# Patient Record
Sex: MALE | Race: BLACK OR AFRICAN AMERICAN | Employment: UNEMPLOYED | URBAN - METROPOLITAN AREA
[De-identification: names, ages, dates, MRNs, and addresses within clinical notes are randomized per-mention and may not be internally consistent; named-entity substitution may affect disease eponyms.]

---

## 2017-09-10 ENCOUNTER — HOSPITAL ENCOUNTER (EMERGENCY)
Facility: CLINIC | Age: 22
Discharge: NURSING, REHAB OR LONG-TERM CARE | End: 2017-09-11
Attending: EMERGENCY MEDICINE | Admitting: EMERGENCY MEDICINE
Payer: COMMERCIAL

## 2017-09-10 VITALS
HEIGHT: 70 IN | HEART RATE: 81 BPM | WEIGHT: 145 LBS | SYSTOLIC BLOOD PRESSURE: 125 MMHG | TEMPERATURE: 98.6 F | DIASTOLIC BLOOD PRESSURE: 79 MMHG | RESPIRATION RATE: 20 BRPM | OXYGEN SATURATION: 100 % | BODY MASS INDEX: 20.76 KG/M2

## 2017-09-10 DIAGNOSIS — R45.851 SUICIDAL IDEATION: ICD-10-CM

## 2017-09-10 PROCEDURE — 99285 EMERGENCY DEPT VISIT HI MDM: CPT | Mod: 25

## 2017-09-10 PROCEDURE — 90791 PSYCH DIAGNOSTIC EVALUATION: CPT

## 2017-09-10 ASSESSMENT — ENCOUNTER SYMPTOMS
HALLUCINATIONS: 0
SHORTNESS OF BREATH: 0

## 2017-09-10 NOTE — ED PROVIDER NOTES
"  History     Chief Complaint:  Suicidal    HPI   Jesus Roberts is a 21 year old male, with no previous past medical history, who presents via EMS to the emergency department for evaluation of suicidal ideation. The patient was brought in via EMS after sending multiple texts to his ex-girlfriend about \"going to buy a gun and shooting myself\" and that \"this is the last time you'll ever hear from me\" yesterday and earlier today. It was reported that the patient is currently homeless and living out of his car, as his previous living situation was with his mom, before she went to rehabilitation. On questioning, the patient denies any plan to follow through on his threats, but does note he has homicidal ideation to hurt others. The patient reports that he is currently feeling depressed, but has never been hospitalized for mental illness. He notes that he also smokes marijuana \"a lot and all day\". The patient denies any alcohol use, chest pain or shortness of breath.     Allergies:  No Known Drug Allergies      Medications:    The patient is not currently taking any prescribed medications.     Past Medical History:    The patient denies any relevant past medical history.     Past Surgical History:    History reviewed. No pertinent past surgical history.     Family History:    Mother currently in rehab    Social History:  The patient was accompanied to the ED by EMS.  Smoking Status: Marijuana  Smokeless Tobacco: N/A  Alcohol Use: No   Marital Status:  Single [1]     Review of Systems   Respiratory: Negative for shortness of breath.    Cardiovascular: Negative for chest pain.   Psychiatric/Behavioral: Negative for hallucinations and suicidal ideas.        Homicidal ideation   All other systems reviewed and are negative.    Physical Exam   Vitals:  Patient Vitals for the past 24 hrs:   BP Temp Temp src Pulse Resp SpO2 Height Weight   09/10/17 1800 - - - - - - 1.778 m (5' 10\") 65.8 kg (145 lb)   09/10/17 1758 125/79 98.6 "  F (37  C) Temporal 81 20 100 % - -       Physical Exam  General: Patient in mild distress.  Alert and cooperative with exam. Normal mentation  HEENT: NC/AT. Conjunctiva without injection or scleral icterus. External ears normal.  Respiratory: Breathing comfortably on room air  CV: Normal rate, all extremities well perfused  GI:  Non-distended abdomen  Skin: Warm, dry, no rashes/open wounds on exposed skin  Musculoskeletal: No obvious deformities  Neuro: Alert, answers questions appropriately. No gross motor deficits  Psychiatric: Flat affect. Endorses making suicidal statements, denies current suicidal ideation. Endorses passive homicidal ideation with no intended target. Denies hallucinations. Endorses daily marijuana use and depression    Emergency Department Course   Emergency Department Course:  Nursing notes and vitals reviewed.  I performed an exam of the patient as documented above.     I personally answered all related questions prior to transfer to another facility.    Impression & Plan      Medical Decision Making:  Patient is a 21 year old male who presents on  hold after making suicidal statements via text. Patient's medical history and records were reviewed. On evaluation, he denies any toxic ingestion or intentional overdose. Endorses daily marijuana use.. Patient has a flat affect and provides minimal additional information. He does report that he sent suicidal text messages, however, states he no longer feel suicidal. Patient additionally reports passive homicidal ideation with no identified target. Given nature of patient's text messages and affect on exam, there is concern for patient's safety. He will be placed on a 72 hour hold and will be transferred to [facility] for further mental evaluation and care. Patient remains stable throughout ED stay.    Diagnosis:    ICD-10-CM    1. Suicidal ideation R45.851       Disposition:   Transfer to [place].    Scribe Disclosure:  GRACIE  Leia Vogt, am serving as a scribe at 6:09 PM on 9/10/2017 to document services personally performed by David Jacobson DO, based on my observations and the provider's statements to me.  9/10/2017    EMERGENCY DEPARTMENT       David Jacobson DO  09/10/17 6575

## 2017-09-10 NOTE — ED NOTES
Bed: BH3  Expected date:   Expected time:   Means of arrival:   Comments:  Marisa 444 Suicidal Hold 51 male

## 2017-09-11 ENCOUNTER — HOSPITAL ENCOUNTER (INPATIENT)
Facility: CLINIC | Age: 22
LOS: 1 days | Discharge: HOME | DRG: 882 | End: 2017-09-11
Attending: PSYCHIATRY & NEUROLOGY | Admitting: PSYCHIATRY & NEUROLOGY
Payer: COMMERCIAL

## 2017-09-11 VITALS
RESPIRATION RATE: 16 BRPM | WEIGHT: 143.38 LBS | DIASTOLIC BLOOD PRESSURE: 66 MMHG | HEART RATE: 54 BPM | OXYGEN SATURATION: 99 % | BODY MASS INDEX: 20.53 KG/M2 | SYSTOLIC BLOOD PRESSURE: 131 MMHG | HEIGHT: 70 IN | TEMPERATURE: 97.9 F

## 2017-09-11 PROBLEM — R45.851 SUICIDAL IDEATION: Status: ACTIVE | Noted: 2017-09-11

## 2017-09-11 PROCEDURE — 25000132 ZZH RX MED GY IP 250 OP 250 PS 637: Performed by: PSYCHIATRY & NEUROLOGY

## 2017-09-11 PROCEDURE — 12400008 ZZH R&B MH INTERMEDIATE ADOLESCENT

## 2017-09-11 PROCEDURE — 99235 HOSP IP/OBS SAME DATE MOD 70: CPT | Performed by: CLINICAL NURSE SPECIALIST

## 2017-09-11 RX ORDER — OLANZAPINE 5 MG/1
10 TABLET ORAL
Status: DISCONTINUED | OUTPATIENT
Start: 2017-09-11 | End: 2017-09-11 | Stop reason: HOSPADM

## 2017-09-11 RX ORDER — OLANZAPINE 10 MG/2ML
10 INJECTION, POWDER, FOR SOLUTION INTRAMUSCULAR
Status: DISCONTINUED | OUTPATIENT
Start: 2017-09-11 | End: 2017-09-11 | Stop reason: HOSPADM

## 2017-09-11 RX ORDER — BISACODYL 10 MG
10 SUPPOSITORY, RECTAL RECTAL DAILY PRN
Status: DISCONTINUED | OUTPATIENT
Start: 2017-09-11 | End: 2017-09-11 | Stop reason: HOSPADM

## 2017-09-11 RX ORDER — TRAZODONE HYDROCHLORIDE 50 MG/1
50 TABLET, FILM COATED ORAL
Status: DISCONTINUED | OUTPATIENT
Start: 2017-09-11 | End: 2017-09-11 | Stop reason: HOSPADM

## 2017-09-11 RX ORDER — ALUMINA, MAGNESIA, AND SIMETHICONE 2400; 2400; 240 MG/30ML; MG/30ML; MG/30ML
30 SUSPENSION ORAL EVERY 4 HOURS PRN
Status: DISCONTINUED | OUTPATIENT
Start: 2017-09-11 | End: 2017-09-11 | Stop reason: HOSPADM

## 2017-09-11 RX ORDER — HYDROXYZINE HYDROCHLORIDE 25 MG/1
25-50 TABLET, FILM COATED ORAL EVERY 4 HOURS PRN
Status: DISCONTINUED | OUTPATIENT
Start: 2017-09-11 | End: 2017-09-11 | Stop reason: HOSPADM

## 2017-09-11 RX ORDER — ACETAMINOPHEN 325 MG/1
650 TABLET ORAL EVERY 4 HOURS PRN
Status: DISCONTINUED | OUTPATIENT
Start: 2017-09-11 | End: 2017-09-11 | Stop reason: HOSPADM

## 2017-09-11 RX ADMIN — ACETAMINOPHEN 650 MG: 325 TABLET, FILM COATED ORAL at 17:25

## 2017-09-11 NOTE — PLAN OF CARE
"Problem: Depressive Symptoms  Goal: Depressive Symptoms  Signs and symptoms of listed problems will be absent or manageable.    Patient, prior to discharge, will:  -verbalize decrease in depressive signs/symptoms  -verbalize a decrease in anxiety   -verbalize an understanding of medication regimen   -verbalize absence of SI/SIB   -develop a safety plan  -identify a support system   -will participate in coordination of discharge planning    To promote safety/ mental health    Patient identified the following   Triggers:  ----------  Wellness Strategies:  ----------  Warning Signs:  ----------  Feedback (people they would like to receive feedback from if early warning signs - ex. Family, friends, partner/spouse, support groups, coworkers):  ----------  Taking Action:  ----------  Ways to [place]:      Self-Reflection & Planning.  Assessed patient s progress completing forms related to Illness Management Recovery (including Personal Plan of Care, Adult Coping Plan, and My Support and Coping Plan) and assisted as needed.    Encouraged patient to continue to consider triggers, wellness strategies, early warning signs, feedback from others, actions to take to prevent relapse, and coping strategies as part of a plan to remain well after leaving the hospital.     Outcome: Adequate for Discharge Date Met:  09/11/17 09/11/17 1750   Depressive Symptoms   Depressive Symptoms Assessed all   Depressive Symptoms Present mood;insight;thought process         Discharge order is received.  Reviewed and discussed discharge instructions and follow up care with patient.  Patient is ready to discharge AEB \"I am ready to go.\"  Patient denies Si, SIB or HI. \"I was impulsive when I sent that text.\" patient stated. Personal belongings are given. All forms are signed. Patient denied any further questions or concerns.  Patient discharged to home. Patient refused transportation assistance to home.       "

## 2017-09-11 NOTE — PROGRESS NOTES
09/11/17 1400   Behavioral Health   Hallucinations denies / not responding to hallucinations   Thinking intact   Orientation person, disoriented;place, disoriented;date, disoriented;time, disoriented   Memory baseline memory   Insight admits / accepts   Judgement intact   Eye Contact at examiner   Affect full range affect   Mood mood is calm   Physical Appearance/Attire attire appropriate to age and situation   Hygiene well groomed   Suicidality (GEE)   Self Injury (GEE)   Activity (social )   Speech clear;coherent   Medication Sensitivity no observed side effects   Psychomotor / Gait balanced;steady   Pt was polite and respectful towards staff and other pt's. Pt is going to discharged within minutes and stated he is excited to go home and have the ability to go for a run. No concerns to note

## 2017-09-11 NOTE — IP AVS SNAPSHOT
Child Adolescent  Inpatient Unit    [address] [facility] [address]    Phone:  [phone]    Fax:  [phone]                                       After Visit Summary   9/11/2017    Jesus Roberts    MRN: [id number]           After Visit Summary Signature Page     I have received my discharge instructions, and my questions have been answered. I have discussed any challenges I see with this plan with the nurse or doctor.    ..........................................................................................................................................  Patient/Patient Representative Signature      ..........................................................................................................................................  Patient Representative Print Name and Relationship to Patient    ..................................................               ................................................  Date                                            Time    ..........................................................................................................................................  Reviewed by Signature/Title    ...................................................              ..............................................  Date                                                            Time

## 2017-09-11 NOTE — IP AVS SNAPSHOT
MRN:[id number]                      After Visit Summary   9/11/2017    Jesus Roberts    MRN: [id number]           Thank you!     Thank you for choosing [facility] for your care. Our goal is always to provide you with excellent care.        Patient Information     Date Of Birth          1995        Designated Caregiver       Most Recent Value    Caregiver    Will someone help with your care after discharge? yes    Name of designated caregiver Rock Gunter - cousin    Phone number of caregiver unknown     Caregiver address unknown      About your hospital stay     You were admitted on:  September 11, 2017 You last received care in the:  Child Adolescent  Inpatient Unit    You were discharged on:  September 11, 2017       Who to Call     For medical emergencies, please call 911.  For non-urgent questions about your medical care, please call your primary care provider or clinic, None          Attending Provider     Provider Specialty    Rad Mcdonnell MD Psychiatry       Primary Care Provider    Physician No Ref-Primary      Further instructions from your care team       Behavioral Discharge Planning and Instructions      Summary: You were admitted on 9/11/2017 to Station [address] [place] [address] due to suicidal ideation.  You were treated by Debra Naegele, APRN, CNS and discharged on 09/11/17 from Station  Young Adult Unit to Home.     Principal Diagnosis:   Suicidal Ideations    Health Care Follow-up Appointments:   Below are resources you may contact for therapy and medication management  [facility] [address] (On street parking is available)  [place] [address]  P:  [phone]  M, W, F:   1:00PM - 3:00PM  M - Th:    6:30PM - 8:30PM  At [facility] you have access to counseling services at free of charge and your sessions are confidential. You may remain anonymous if you choose.    [facility]  [address] [facility], [address]  [place] [address]  Phone: [phone]  Fax: [phone]      Attend all scheduled appointments with your outpatient providers. Call at least 24 hours in advance if you need to reschedule an appointment to ensure continued access to your outpatient providers.   Major Treatments, Procedures and Findings: You were provided with: a psychiatric assessment, assessed for medical stability, medication evaluation and/or management, group therapy, art therapy, milieu management, medical interventions and skills/OT groups.    Symptoms to Report: If you experience any of the following symptoms please report them right away to your provider or to family/friends; feeling more aggressive, increased confusion, losing more sleep, mood getting worse or thoughts of suicide.    Early warning signs can include: Early warning signs that could signal a potential relapse could include but not limited to the following; increased depression or anxiety sleep disturbances increased thoughts or behaviors of suicide or self-harm  increased unusual thinking, such as paranoia or hearing voices.    Safety and Wellness: Take all medicines as directed. Make no changes unless your doctor suggests them.      Follow treatment recommendations. Refrain from alcohol and non-prescribed drugs.  Items could include: Firearms  Medicines (both prescribed and over-the-counter)  Knives and other sharp objects  Ropes and like materials  Car keys  If there is a concern for safety, call 911. If there is a concern for safety, call 911..    Resources:    Crisis Intervention: 828.695.2502 or 259-817-3197 (TTY: 309.281.8365).  Call anytime for help.  National [place] on Mental Illness (www.[place].janelle.org): 973.157.7663 or 940-031-0040.  Alcoholics Anonymous (www.alcoholics-anonymous.org): Check your phone book for your local chapter.  National Suicide Prevention Line (www.mentalhealthmn.org): 271-158-LVPD (2397)  Self- Management and Recovery Training., SMART-- Toll free:  "388.461.1483  www.[employer]  [facility] Crisis (COPE) Response - Adult 290 577-1738  Text 4 Life: txt \"LIFE\" to 06561 for immediate support and crisis intervention  Crisis text line: Text \"START\" to 863-927. Free, confidential, .  Crisis Intervention: 860.207.2303 or 351-454-5950. Call anytime for help.     The treatment team has appreciated the opportunity to work with you. Jesus,  please take care and make your recovery a priority. If you have any questions or concerns our unit number is [phone]. You will be receiving a follow-up phone call within the next three days from a representative from behavioral health. You have identified the best phone number to reach you at is [phone] (home) .      Pending Results     No orders found from 2017 to 2017.            Admission Information     Date & Time Provider Department Dept. Phone    2017 Rad Mcdonnell MD Child Adolescent  Inpatient Unit [phone]      Your Vitals Were     Blood Pressure Pulse Temperature Respirations Height Weight    131/66 54 97.9  F (36.6  C) (Oral) 16 1.78 m (5' 10.08\") 65 kg (143 lb 6 oz)    Pulse Oximetry BMI (Body Mass Index)                99% 20.53 kg/m2          MyChart Information     [facility] lets you send messages to your doctor, view your test results, renew your prescriptions, schedule appointments and more. To sign up, go to [email or web address] . Click on \"Log in\" on the left side of the screen, which will take you to the Welcome page. Then click on \"Sign up Now\" on the right side of the page.     You will be asked to enter the access code listed below, as well as some personal information. Please follow the directions to create your username and password.     Your access code is: [email or web address]  Expires: 12/10/2017  1:46 PM     Your access code will  in 90 days. If you need help or a new code, please call your [facility] clinic or [phone].        Care EveryWhere ID     " This is your Care EveryWhere ID. This could be used by other organizations to access your [facility] medical records  [id number]        Equal Access to Services     JESSICA TRACY : Adriana Morgan, jon guajardobarbha, miguelalma locosocratescata cintronlars, waxeugenia alexin hayaadiego cintronlatoya cotter lakaileediego moffett. So [facility] [phone].    ATENCIÓN: Si habla español, tiene a blum disposición servicios gratuitos de asistencia lingüística. Llame al [phone].    We comply with applicable federal civil rights laws and [place] laws. We do not discriminate on the basis of race, color, national origin, age, disability sex, sexual orientation or gender identity.               Review of your medicines      Notice     You have not been prescribed any medications.             Protect others around you: Learn how to safely use, store and throw away your medicines at www.disposemymeds.org.             Medication List: This is a list of all your medications and when to take them. Check marks below indicate your daily home schedule. Keep this list as a reference.      Notice     You have not been prescribed any medications.

## 2017-09-11 NOTE — PROGRESS NOTES
09/11/17 0303   Patient Belongings   Did you bring any home meds/supplements to the hospital?  No   Patient Belongings cell phone/electronics;clothing;money (see comment);shoes;wallet   Disposition of Belongings Cash, debit, & ID cards to security. All other items are locked in storage locker #27 on [address].   Belongings Search Yes   Clothing Search Yes   Second Staff Donovan TAMAYO     Items sent to security in envelope #[id number]: $29.00 cash, [place] drivers license, AAA member card, visa debit ending in 3632, TCF debit ending in 7406,    Items locked in locker #27 on 7AE: gray hooded sweatshirt, green sweatpants w/ strings, white shoes w/ laces, lighter, cigarettes, smart phone, phone , brown wallet (contains club mystic card, fortune cookie fortunes, pictures, and various business cards).    A               Admission:  I am responsible for any personal items that are not sent to the safe or pharmacy.  [facility] is not responsible for loss, theft or damage of any property in my possession.    Signature:  _________________________________ Date: _______  Time: _____                                              Staff Signature:  ____________________________ Date: ________  Time: _____      2nd Staff person, if patient is unable/unwilling to sign:    Signature: ________________________________ Date: ________  Time: _____     Discharge:  [facility] has returned all of my personal belongings:    Signature: _________________________________ Date: ________  Time: _____                                          Staff Signature:  ____________________________ Date: ________  Time: _____

## 2017-09-11 NOTE — ED NOTES
"Pt refuses to provide urine specimen. Demands to talk to someone who can \"let me out of here.\" Provider notified.  "

## 2017-09-11 NOTE — PROGRESS NOTES
Writer spoke with pt's cousin Rock Gunter ([phone]). Rock stated he did not have any concerns regarding pt's mental health and that pt could come and stay with him upon d/c. Info relayed to provider

## 2017-09-11 NOTE — PROGRESS NOTES
Pt did not attend any Art Therapy groups today. Writer approached pt and invited him to join but he declined.

## 2017-09-11 NOTE — PHARMACY-ADMISSION MEDICATION HISTORY
Admission medication history interview status for the 9/10/2017  admission is complete. See EPIC admission navigator for prior to admission medications     Medication history source reliability:Good    Actions taken by pharmacist (provider contacted, etc):None     Additional medication history information not noted on PTA med list : Patient denies use of any prescription medications or over the counters such as vitamins, herbals, supplements, or ibuprofen/acetaminophen.     Medication reconciliation/reorder completed by provider prior to medication history? No    Time spent in this activity: 5 minutes      Prior to Admission medications    Not on File
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
## 2017-09-11 NOTE — DISCHARGE INSTRUCTIONS
Behavioral Discharge Planning and Instructions      Summary: You were admitted on 9/11/2017 to Station [address] [place] [address] due to suicidal ideation.  You were treated by Debra Naegele, APRN, CNS and discharged on 09/11/17 from [facility] Young Adult Unit to Home.     Principal Diagnosis:   Suicidal Ideations    Health Care Follow-up Appointments:   Below are resources you may contact for therapy and medication management  [facility] [address] (On street parking is available)  [place] [address]  P:  [phone]  M, W, F:   1:00PM - 3:00PM  M - Th:    6:30PM - 8:30PM  At [facility] you have access to counseling services at free of charge and your sessions are confidential. You may remain anonymous if you choose.    [facility]  [address] [facility], [address] [address]  Phone: [phone]  Fax: [phone]      Attend all scheduled appointments with your outpatient providers. Call at least 24 hours in advance if you need to reschedule an appointment to ensure continued access to your outpatient providers.   Major Treatments, Procedures and Findings: You were provided with: a psychiatric assessment, assessed for medical stability, medication evaluation and/or management, group therapy, art therapy, milieu management, medical interventions and skills/OT groups.    Symptoms to Report: If you experience any of the following symptoms please report them right away to your provider or to family/friends; feeling more aggressive, increased confusion, losing more sleep, mood getting worse or thoughts of suicide.    Early warning signs can include: Early warning signs that could signal a potential relapse could include but not limited to the following; increased depression or anxiety sleep disturbances increased thoughts or behaviors of suicide or self-harm  increased unusual thinking, such as paranoia or hearing voices.    Safety and Wellness: Take all  "medicines as directed. Make no changes unless your doctor suggests them.      Follow treatment recommendations. Refrain from alcohol and non-prescribed drugs.  Items could include: Firearms  Medicines (both prescribed and over-the-counter)  Knives and other sharp objects  Ropes and like materials  Car keys  If there is a concern for safety, call 911. If there is a concern for safety, call 911..    Resources:    Crisis Intervention: 158.958.1650 or 798-215-6294 (TTY: 397.841.7635).  Call anytime for help.  National [place] on Mental Illness (www.[place].janelle.org): 584.565.6824 or 911-137-7531.  Alcoholics Anonymous (www.alcoholics-anonymous.org): Check your phone book for your local chapter.  National Suicide Prevention Line (www.mentalhealthmn.org): 738-071-EIGL (8566)  Self- Management and Recovery Training., SMART-- Toll free: 272.963.2404  www.[employer]  [facility] Crisis (COPE) Response - Adult 591 851-7626  Text 4 Life: txt \"LIFE\" to 99472 for immediate support and crisis intervention  Crisis text line: Text \"START\" to 855-904. Free, confidential, 24/7.  Crisis Intervention: 312.514.3962 or 686-429-0588. Call anytime for help.     The treatment team has appreciated the opportunity to work with you. Jesus,  please take care and make your recovery a priority. If you have any questions or concerns our unit number is [phone]. You will be receiving a follow-up phone call within the next three days from a representative from behavioral health. You have identified the best phone number to reach you at is [phone] (home) .    "

## 2017-09-12 NOTE — H&P
"DATE OF ASSESSMENT:  09/11/2017      IDENTIFYING INFORMATION:  Jesus Roberts is a 21-year-old single -American male presenting with suicidal ideation.      CHIEF COMPLAINT:  \"I told the ER doc I was not suicidal.\"      HISTORY OF PRESENT ILLNESS:  Jesus Landin is a 21-year-old single male who was brought in by EMS.  He was presenting with depression, suicidal thinking and threats.  Patient texted his girlfriend 7 times saying that he was going to get a gun and shoot himself.  He reports that he sent these texts within a span of less than 10 minutes.  Patient reports he has been arguing with his girlfriend.  They have joint custody of their 4-month-old son.  He has other life stressors including his mother is getting chemical dependency treatment in [place].  He is not sure if she is addicted to heroin or meth.  Patient reports since his mother is away at treatment, he is homeless.  Patient reports he can live with his cousin.  Patient is looking for a job but was unable to find his legal papers including social security number and birth certificate.  Patient reports he wants to start working and live with his cousin.        PSYCHIATRIC REVIEW OF SYSTEMS:  Patient reports that he has anxiety due to his recent life stressors including mother going into treatment and being homeless.  Patient reports that when his anxiety gets high, he often will get depressed.  He is denying any low energy, poor appetite or poor concentration.  He denies feeling hopeless and helpless.  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  He denies any suicidal thoughts.  He does not have a plan.  He denies any homicidal thoughts.  He denies any symptoms of anthony.  He does not endorse psychosis including auditory and visual hallucinations.  He does not endorse any paranoia.  The patient reports he does get anxiety about being a single parent and " "working out custody with his girlfriend regarding their 4-month-old son.  Patient denies any panic disorder.  Patient denies any symptoms of PTSD, eating disorder or OCD.      PSYCHIATRIC HISTORY:  Patient reports he had a rule 25 assessment when he was 16 years old.  He denies ever going to treatment.  He has never been treated for mental illness.  He has no prior inpatient hospitalizations.  He denies any prior suicide attempts or engaging in any self-injurious behavior.  He has never been treated with any psychiatric medications.      PAST MEDICAL HISTORY:  No active issues reported.      SUBSTANCE ABUSE HISTORY:  Patient refused to take a U-tox. \"I thought you would keep me longer in the hospital.\" He reports that he is a daily cannabis user.  He denies using alcohol.  He denies any legal problems from cannabis use.      ALLERGIES:  No known drug allergies.      FAMILY HISTORY:  Patient reports his mother is currently in rehab for either heroin or meth use.  He reports he does not know his father and is not aware of any mental illness on his father's side of the family.      SOCIAL HISTORY:  Patient reports he lived in [place].  He moved to [place] during middle school.  Reports that he has completed high school.  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  He currently is homeless and is not employed currently. 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      REVIEW OF SYSTEMS:  Reviewed documentation for a 10 point systems review completed by Dr. David Jacobson dated 09/10/2017, no changes noted.      PHYSICAL EXAMINATION:   VITAL SIGNS:  Blood pressure 125/79, temperature is 98.6 Fahrenheit, pulse is 81, respirations 20, spo2 is at 100%.  Height is 5 feet, 10 inches, weight is 145 pounds.  Reviewed documentation for the physical examination completed by Dr. Jacobson dated 09/10/2017:  No changes noted.      MENTAL STATUS EXAMINATION:  Patient appears his stated age.  He is " dressed in scrubs.  He has adequate hygiene.  Patient was in the [facility] area and was cooperative in accompanying me to the interview room.  He was calm throughout the interview.  He maintained adequate eye contact.  He did not display any psychomotor abnormalities.  His speech was spontaneous.  He used conversational rate, rhythm and tone.  He elaborated appropriately with regard to the events that led up to his hospitalization.  He describes his mood today as okay.  Affect was full range and congruent.  Thought process was linear and logical.  Associations were intact.  Thought content did not display any evidence of psychosis.  He does not endorse any suicidal thinking.  He does not have any active plan.  He denies any homicidal thoughts.  Insight and judgment appear to be fair.  Cognition appears intact to interviewing including orientation to person, place, time and situation, use of language and fund of knowledge.  His recent and remote memory are grossly intact.  Muscle strength, tone and gait appear to be within normal limits upon observation.      ASSESSMENT:  Adjustment disorder with anxiety.                               Cannabis use disorder moderate     PLAN:   1.  The patient has been admitted to unit 4A  Currently residing on 7A on a 72-hour hold.  Discontinue 72 hour hold.  Patient is cooperative and willing to cooperate with his treatment plan.   2.  The patient declined to start any medications to address his anxiety. Patient does not feel he is depressed and declined any medication.  Patient was given education on cannabis and its effect on mood.  Recommendation was to abstain from all mood altering substances.   3.  Psychosocial treatments to be addressed with social work consult.   4. Patient would benefit form therapy. He stated he was interested in therapy and thanked [facility] for referral.         DEBRA A. NAEGELE, APRN, CNS             D: 09/11/2017 17:38   T: 09/11/2017 20:09   MT: ANDRAE      Name:      PABLO HER   MRN:      [id number]        Account:      [id number]   :      1995           Admitted:     [id number]      Document: [id number]

## 2017-09-12 NOTE — DISCHARGE SUMMARY
Psychiatric Discharge Summary    Jesus Roberts MRN# [id number]   Age: 21 year old YOB: 1995     Date of Admission:  9/11/2017  Date of Discharge:  9/11/2017  5:30 PM  Admitting Physician:  Rad Mcdonnell MD  Discharge Physician:  Debra A. Naegele, APRN CNS (Contact: [phone])         Event Leading to Hospitalization:   Jesus Landin is a 21-year-old single male who was brought in by EMS.  He was presenting with depression, suicidal thinking and threats.  Patient texted his girlfriend 7 times saying that he was going to get a gun and shoot himself.  He reports that he sent these texts within a span of less than 10 minutes.  Patient reports he has been arguing with his girlfriend.  They have joint custody of their 4-month-old son.  He has other life stressors including his mother is getting chemical dependency treatment in [place].  He is not sure if she is addicted to heroin or meth.  Patient reports since his mother is away at treatment, he is homeless.  Patient reports he can live with his cousin.  Patient is looking for a job but was unable to find his legal papers including social security number and birth certificate.  Patient reports he wants to start working and live with his cousin.              See Admission note by Debra Naegele APRN, CNS found on 9/11/2017 for additional details.          DIagnoses:    Adjustment disorder with anxiety.   Cannabis use disorder moderate           Labs:   No results found for this or any previous visit.         Consults:   No consultations this admission.          Hospital Course:   Jesus Roberts was admitted to Station 4A currently residing on 7A with attending Angel Mcdonnell MD found on a 72 hour mental health hold.  The 72 hour hold was discontinued. The patient was placed under status 15 (15 minute checks) to ensure patient safety.     Patient was admitted for suicidal ideation. Patient reported he has multiple life stressors which  he was having difficulty managing. He was not interested in medication but was interested in therapy.     Patient was given education regarding cannabis use and the detrimental effects if has on mood. Recommendation was to abstain from all mood altering substances. He does not feel he needs chemical dependency treatment. He declined an assessment saying he was assessed when he was 16 years old and treatment was not recommended at that time.     Jesus Roberts did participate in groups and was visible in the milieu. The patient's symptoms of suicidal ideation improved. Patient presented with a stable mood and denied any suicidal ideation. He has protective factors of a supportive cousin and mother. He is future oriented and wants to pursue employment with the help of his cousin. He is interested in therapy to help him manage his life stressors.     He does not meet criteria for hospitalization. He is at low risk for relapse.     Jesus Roberts was released to home. At the time of discharge Jesus Roberts was determined to not be a danger to himself or others.          Discharge Medications:   There are no discharge medications for this patient.           Psychiatric Examination:   Appearance:  awake, alert and adequately groomed  Attitude:  cooperative  Eye Contact:  good  Mood:  good  Affect:  appropriate and in normal range  Speech:  clear, coherent  Psychomotor Behavior:  no evidence of tardive dyskinesia, dystonia, or tics  Thought Process:  logical, linear and goal oriented  Associations:  no loose associations  Thought Content:  no evidence of suicidal ideation or homicidal ideation  Insight:  fair  Judgment:  fair  Oriented to:  time, person, and place  Attention Span and Concentration:  intact  Recent and Remote Memory:  intact  Language: Able to name objects, Able to repeat phrases and Able to read and write  Fund of Knowledge: adequate  Muscle Strength and Tone: normal  Gait and Station: Normal          Discharge Plan:   Summary: You were admitted on 9/11/2017 to Station [address] [place] [address] due to suicidal ideation.  You were treated by Debra Naegele, APRN, CNS and discharged on 09/11/17 from Station [facility] Young Adult Unit to Home.      Principal Diagnosis:   Suicidal Ideations     Health Care Follow-up Appointments:   Below are resources you may contact for therapy and medication management  [facility] [address] (On street parking is available)  [place] [address]  P:  [phone]  M, W, F:   1:00PM - 3:00PM  M - Th:    6:30PM - 8:30PM  At [facility] you have access to counseling services at free of charge and your sessions are confidential. You may remain anonymous if you choose.     [facility]  [address] [facility], [address] [address]  Phone: [phone]  Fax: [phone]        Attend all scheduled appointments with your outpatient providers. Call at least 24 hours in advance if you need to reschedule an appointment to ensure continued access to your outpatient providers.   Major Treatments, Procedures and Findings: You were provided with: a psychiatric assessment, assessed for medical stability, medication evaluation and/or management, group therapy, art therapy, milieu management, medical interventions and skills/OT groups.     Symptoms to Report: If you experience any of the following symptoms please report them right away to your provider or to family/friends; feeling more aggressive, increased confusion, losing more sleep, mood getting worse or thoughts of suicide.     Early warning signs can include: Early warning signs that could signal a potential relapse could include but not limited to the following; increased depression or anxiety sleep disturbances increased thoughts or behaviors of suicide or self-harm  increased unusual thinking, such as paranoia or hearing voices.     Safety and Wellness: Take all medicines as directed. Make  "no changes unless your doctor suggests them.      Follow treatment recommendations. Refrain from alcohol and non-prescribed drugs.  Items could include: Firearms  Medicines (both prescribed and over-the-counter)  Knives and other sharp objects  Ropes and like materials  Car keys  If there is a concern for safety, call 911. If there is a concern for safety, call 911..     Resources:    Crisis Intervention: 574.769.6984 or 861-996-6365 (TTY: 643.310.8964).  Call anytime for help.  National [place] on Mental Illness (www.mn.janelle.org): 737.394.4542 or 955-916-2583.  Alcoholics Anonymous (www.alcoholics-anonymous.org): Check your phone book for your local chapter.  National Suicide Prevention Line (www.mentalhealthmn.org): 724-709-WSTK (1405)  Self- Management and Recovery Training., SMART-- Toll free: 996.408.8462  www.[employer]  [facility] Crisis (COPE) Response - Adult 136 507-5037  Text 4 Life: txt \"LIFE\" to 65806 for immediate support and crisis intervention  Crisis text line: Text \"START\" to 775-474. Free, confidential, 24/7.  Crisis Intervention: 430.974.8600 or 459-533-8085. Call anytime for help.      The treatment team has appreciated the opportunity to work with you. Jesus,  please take care and make your recovery a priority. If you have any questions or concerns our unit number is [phone]. You will be receiving a follow-up phone call within the next three days from a representative from behavioral health. You have identified the best phone number to reach you at is [phone] (home) .     Attestation:  The patient has been seen and evaluated by me,  Debra A. Naegele, APRN CNS on 9/11/2017  Discharge summary time > 30 minutes  "

## 2019-10-31 ENCOUNTER — HOSPITAL ENCOUNTER (EMERGENCY)
Facility: CLINIC | Age: 24
Discharge: HOME | End: 2019-10-31
Attending: EMERGENCY MEDICINE | Admitting: EMERGENCY MEDICINE
Payer: COMMERCIAL

## 2019-10-31 VITALS
HEIGHT: 70 IN | RESPIRATION RATE: 20 BRPM | WEIGHT: 135 LBS | HEART RATE: 67 BPM | OXYGEN SATURATION: 100 % | SYSTOLIC BLOOD PRESSURE: 134 MMHG | TEMPERATURE: 98.6 F | BODY MASS INDEX: 19.33 KG/M2 | DIASTOLIC BLOOD PRESSURE: 67 MMHG

## 2019-10-31 DIAGNOSIS — R10.9 RIGHT FLANK PAIN: ICD-10-CM

## 2019-10-31 LAB
ALBUMIN SERPL-MCNC: 4.4 G/DL (ref 3.4–5)
ALBUMIN UR-MCNC: 10 MG/DL
ALP SERPL-CCNC: 70 U/L (ref 40–150)
ALT SERPL W P-5'-P-CCNC: 20 U/L (ref 0–70)
ANION GAP SERPL CALCULATED.3IONS-SCNC: 5 MMOL/L (ref 3–14)
APPEARANCE UR: CLEAR
AST SERPL W P-5'-P-CCNC: 16 U/L (ref 0–45)
BASOPHILS # BLD AUTO: 0 10E9/L (ref 0–0.2)
BASOPHILS NFR BLD AUTO: 0.5 %
BILIRUB SERPL-MCNC: 0.9 MG/DL (ref 0.2–1.3)
BILIRUB UR QL STRIP: NEGATIVE
BUN SERPL-MCNC: 13 MG/DL (ref 7–30)
CALCIUM SERPL-MCNC: 9.2 MG/DL (ref 8.5–10.1)
CHLORIDE SERPL-SCNC: 107 MMOL/L (ref 94–109)
CO2 SERPL-SCNC: 27 MMOL/L (ref 20–32)
COLOR UR AUTO: YELLOW
CREAT SERPL-MCNC: 1.1 MG/DL (ref 0.66–1.25)
D DIMER PPP FEU-MCNC: <0.3 UG/ML FEU (ref 0–0.5)
DIFFERENTIAL METHOD BLD: NORMAL
EOSINOPHIL # BLD AUTO: 0.2 10E9/L (ref 0–0.7)
EOSINOPHIL NFR BLD AUTO: 2.4 %
ERYTHROCYTE [DISTWIDTH] IN BLOOD BY AUTOMATED COUNT: 13.1 % (ref 10–15)
GFR SERPL CREATININE-BSD FRML MDRD: >90 ML/MIN/{1.73_M2}
GLUCOSE SERPL-MCNC: 87 MG/DL (ref 70–99)
GLUCOSE UR STRIP-MCNC: NEGATIVE MG/DL
HCT VFR BLD AUTO: 46.2 % (ref 40–53)
HGB BLD-MCNC: 16.3 G/DL (ref 13.3–17.7)
HGB UR QL STRIP: NEGATIVE
IMM GRANULOCYTES # BLD: 0 10E9/L (ref 0–0.4)
IMM GRANULOCYTES NFR BLD: 0.2 %
INTERPRETATION ECG - MUSE: NORMAL
KETONES UR STRIP-MCNC: 40 MG/DL
LEUKOCYTE ESTERASE UR QL STRIP: NEGATIVE
LIPASE SERPL-CCNC: 56 U/L (ref 73–393)
LYMPHOCYTES # BLD AUTO: 1.9 10E9/L (ref 0.8–5.3)
LYMPHOCYTES NFR BLD AUTO: 23.8 %
MCH RBC QN AUTO: 32.7 PG (ref 26.5–33)
MCHC RBC AUTO-ENTMCNC: 35.3 G/DL (ref 31.5–36.5)
MCV RBC AUTO: 93 FL (ref 78–100)
MONOCYTES # BLD AUTO: 0.6 10E9/L (ref 0–1.3)
MONOCYTES NFR BLD AUTO: 7.1 %
MUCOUS THREADS #/AREA URNS LPF: PRESENT /LPF
NEUTROPHILS # BLD AUTO: 5.3 10E9/L (ref 1.6–8.3)
NEUTROPHILS NFR BLD AUTO: 66 %
NITRATE UR QL: NEGATIVE
NRBC # BLD AUTO: 0 10*3/UL
NRBC BLD AUTO-RTO: 0 /100
PH UR STRIP: 6 PH (ref 5–7)
PLATELET # BLD AUTO: 218 10E9/L (ref 150–450)
POTASSIUM SERPL-SCNC: 3.7 MMOL/L (ref 3.4–5.3)
PROT SERPL-MCNC: 7.8 G/DL (ref 6.8–8.8)
RBC # BLD AUTO: 4.98 10E12/L (ref 4.4–5.9)
RBC #/AREA URNS AUTO: 2 /HPF (ref 0–2)
SODIUM SERPL-SCNC: 139 MMOL/L (ref 133–144)
SOURCE: ABNORMAL
SP GR UR STRIP: 1.03 (ref 1–1.03)
UROBILINOGEN UR STRIP-MCNC: 4 MG/DL (ref 0–2)
WBC # BLD AUTO: 8 10E9/L (ref 4–11)
WBC #/AREA URNS AUTO: 1 /HPF (ref 0–5)

## 2019-10-31 PROCEDURE — 81001 URINALYSIS AUTO W/SCOPE: CPT | Performed by: EMERGENCY MEDICINE

## 2019-10-31 PROCEDURE — 99284 EMERGENCY DEPT VISIT MOD MDM: CPT | Mod: 25

## 2019-10-31 PROCEDURE — 85025 COMPLETE CBC W/AUTO DIFF WBC: CPT | Performed by: EMERGENCY MEDICINE

## 2019-10-31 PROCEDURE — 93005 ELECTROCARDIOGRAM TRACING: CPT

## 2019-10-31 PROCEDURE — 25000128 H RX IP 250 OP 636: Performed by: EMERGENCY MEDICINE

## 2019-10-31 PROCEDURE — 83690 ASSAY OF LIPASE: CPT | Performed by: EMERGENCY MEDICINE

## 2019-10-31 PROCEDURE — 96374 THER/PROPH/DIAG INJ IV PUSH: CPT

## 2019-10-31 PROCEDURE — 80053 COMPREHEN METABOLIC PANEL: CPT | Performed by: EMERGENCY MEDICINE

## 2019-10-31 PROCEDURE — 85379 FIBRIN DEGRADATION QUANT: CPT | Performed by: EMERGENCY MEDICINE

## 2019-10-31 RX ORDER — KETOROLAC TROMETHAMINE 15 MG/ML
10 INJECTION, SOLUTION INTRAMUSCULAR; INTRAVENOUS ONCE
Status: COMPLETED | OUTPATIENT
Start: 2019-10-31 | End: 2019-10-31

## 2019-10-31 RX ORDER — LIDOCAINE 50 MG/G
1 PATCH TOPICAL EVERY 24 HOURS
Qty: 15 PATCH | Refills: 0 | Status: SHIPPED | OUTPATIENT
Start: 2019-10-31 | End: 2019-11-15

## 2019-10-31 RX ORDER — CYCLOBENZAPRINE HCL 10 MG
10 TABLET ORAL 3 TIMES DAILY PRN
Qty: 20 TABLET | Refills: 0 | Status: SHIPPED | OUTPATIENT
Start: 2019-10-31 | End: 2019-11-07

## 2019-10-31 RX ORDER — IBUPROFEN 600 MG/1
600 TABLET, FILM COATED ORAL EVERY 6 HOURS PRN
Qty: 30 TABLET | Refills: 0 | Status: SHIPPED | OUTPATIENT
Start: 2019-10-31

## 2019-10-31 RX ADMIN — KETOROLAC TROMETHAMINE 10 MG: 15 INJECTION, SOLUTION INTRAMUSCULAR; INTRAVENOUS at 17:12

## 2019-10-31 ASSESSMENT — ENCOUNTER SYMPTOMS
ARTHRALGIAS: 1
MYALGIAS: 1
FEVER: 0
DIAPHORESIS: 1

## 2019-10-31 ASSESSMENT — MIFFLIN-ST. JEOR: SCORE: 1618.61

## 2019-10-31 NOTE — ED AVS SNAPSHOT
Emergency Department  [address] [place] [address]  Phone:  [phone]  Fax:  [phone]                                    Jesus Roberts   MRN: [id number]    Department:   Emergency Department   Date of Visit:  10/31/2019           After Visit Summary Signature Page    I have received my discharge instructions, and my questions have been answered. I have discussed any challenges I see with this plan with the nurse or doctor.    ..........................................................................................................................................  Patient/Patient Representative Signature      ..........................................................................................................................................  Patient Representative Print Name and Relationship to Patient    ..................................................               ................................................  Date                                   Time    ..........................................................................................................................................  Reviewed by Signature/Title    ...................................................              ..............................................  Date                                               Time          22EPIC Rev 08/18

## 2019-10-31 NOTE — ED PROVIDER NOTES
"  History     Chief Complaint:  Rib Injury       HPI   Jesus Roberts is a 23 year old male with history of asthma who presents to the emergency department today for evaluation of rib injury. The patient reports three days prior he fell onto the pavement from standing height landing on his back. Catching himself with his arms. He didn't hit his head, and there was no loss of consciousness.  He says a few hours later he developed pain in his right rib cage, and notices it more when he laughs or coughs. Today coughed and heard a \"pop\" which worsened his pain. HE feels better with holding his RUQ and deep pressure. Due to these symptoms he presented to Urgent Care where he had a normal chest x-ray. He was referred to the emergency department for further work up. He denies fever, and coughing up blood.     PE/DVT Risk Factors:  Patient is an every day smoker. The patient denies a personal or family history of PE, DVT, or other clotting disorders. The patient denies any recent travel, surgery, or other prolonged immobilization. The patient denies  hormone use.       Allergies:  Penicillins  Seasonal Allergies        Medications:    Albuterol       Past Medical History:    Asthma      Past Surgical History:    History reviewed. No pertinent past surgical history.       Family History:    History reviewed. No pertinent family history.        Social History:  The patient was accompanied to the ED by aunt.  Smoking Status: Current Every Day Smoker  Smokeless Tobacco: Never Used  Alcohol Use: Yes   Marital Status:  Single [1]  [place] ethnicity.   Works in lawn care.       Review of Systems   Constitutional: Positive for diaphoresis. Negative for fever.   Musculoskeletal: Positive for arthralgias (right rib) and myalgias (bilateral calves).   All other systems reviewed and are negative.        Physical Exam   First Vitals:  BP: 119/57  Pulse: 88  Temp: 98.6  F (37  C)  Resp: 20  Height: 177.8 cm (5' 10\")  Weight: 61.2 kg " (135 lb)  SpO2: 100 %      Physical Exam  General: Well-nourished, appears to be in pain  Eyes: PERRL, conjunctivae pink no scleral icterus or conjunctival injection  ENT:  Moist mucus membranes, posterior oropharynx clear without erythema or exudates  Respiratory:  Lungs clear to auscultation bilaterally, no crackles/rubs/wheezes.  Good air movement  CV: Normal rate and rhythm, no murmurs/rubs/gallops  GI:  Abdomen soft and non-distended.  Normoactive BS.  No apparent tenderness, guarding or rebound  Skin: Warm, dry.  No rashes or petechiae. No zoster rash  Musculoskeletal: No peripheral edema or calf tenderness. No flank TTP. No midline spinal TTP  Neuro: Alert and oriented to person/place/time  Psychiatric: Normal affect      Emergency Department Course     ECG:  Indication: rib pain  Completed at 1715.  Read at 1719.   Normal sinus rhythm with sinus arrhythmia. Normal ECG.   Rate 69 bpm. CO interval 134. QRS duration 92. QT/QTc 390/417. P-R-T axes 60 61 61.       Laboratory:  Laboratory findings were communicated with the patient who voiced understanding of the findings.    CBC: WBC 8.0, HGB 16.3,    CMP: WNL. (Creatinine 1.10)   D Dimer (Collected 1714): <0.3   Lipase: 56 (L)     UA: Yellow and Clear. Urineketon 40, Protein Albumin Urine 10, Urobilinogen mg/dL 4.0 (H), Mucous Urine Present o/w WNL        Interventions:  1712 Toradol 10 mg IV       Emergency Department Course:  Nursing notes and vitals reviewed.  1654: I performed an exam of the patient as documented above.    IV was inserted and blood was drawn for laboratory testing, results above.    The patient provided a urine sample here in the emergency department. This was sent for laboratory testing, findings above.     1840 Patient rechecked and updated.      Findings and plan explained to the Patient. Patient discharged home with instructions regarding supportive care, medications, and reasons to return. The importance of close follow-up was  reviewed.   I personally reviewed the laboratory  results with the Patient and answered all related questions prior to discharge.     Impression & Plan      Medical Decision Making:  Jesus Roberts is a 23 year old male who comes with right upper quadrant type pain as well as some pleuritic symptoms.  He does report a recent fall.  Chest x-ray in clinic was negative.  His abdomen is nontender.  No midline spinal tenderness to percussion.  No signs of zoster.  He actually feels better with pressing deeply which makes me suspect that this is muscular skeletal nature.  His gallbladder, liver and pancreatic enzymes are all normal.  Urinalysis shows no blood to suggest a kidney stone.  No signs of urinary tract infection.  The remainder of his labs are reassuring.  Hemoglobin is normal and I have very low suspicion for a liver laceration given the nature of his injury.  No rib tenderness to suggest a rib fracture.  No pneumothorax seen on the outside chest x-ray.  D-dimer is negative and he does not need a CT scan of the chest.  I discussed the risk versus benefits of further imaging of the abdomen.  At this point I feel the risk of CT imaging outweighs the benefits.  He is otherwise well-appearing.  I recommended pain medications.  His aunt requested a prescription for lidocaine patches as well as a muscle relaxer.  This was given to him.  He was also provided with a work note.  We discussed tincture of time and expectant management.  He is asked to return if he becomes worse anyway.    Diagnosis:    ICD-10-CM    1. Right flank pain R10.9        Disposition:  Discharged to home with the below prescription.     Discharge Medications:  Discharge Medication List as of 10/31/2019  6:44 PM      START taking these medications    Details   cyclobenzaprine (FLEXERIL) 10 MG tablet Take 1 tablet (10 mg) by mouth 3 times daily as needed for muscle spasms, Disp-20 tablet, R-0, E-Prescribe      ibuprofen (ADVIL/MOTRIN) 600 MG  tablet Take 1 tablet (600 mg) by mouth every 6 hours as needed for moderate pain, Disp-30 tablet, R-0, E-Prescribe      lidocaine (LIDODERM) 5 % patch Place 1 patch onto the skin every 24 hours for 15 doses To prevent lidocaine toxicity, patient should be patch free for 12 hrs daily.Disp-15 patch, [id number]           Scribe Disclosure:  I, Urvashi Tan, am serving as a scribe at 4:55 PM on 10/31/2019 to document services personally performed by Theodora Pinon MD based on my observations and the provider's statements to me.    Urvashi Tan  10/31/2019    EMERGENCY DEPARTMENT       Theodora Pinon MD  11/01/19 0103

## 2019-10-31 NOTE — DISCHARGE INSTRUCTIONS
*Get plenty of rest and avoid strenuous activities.  *Take medications as prescribed.  Ibuprofen and/or tylenol for pain.  Lidocaine patches as directed as needed for pain. Flexeril as directed as needed for muscle spasm.  Avoid driving or operating heavy machinery while taking this medication.  *Follow-up with your doctor for a recheck within 3-5 days.  *Return to the ER if you develop fever, worsening pain, pain that moves to the right lower abdomen, faint or feel like you will faint or become worse in any way.    Discharge Instructions  Abdominal Pain    Abdominal pain can be caused by many things. Your evaluation today does not show the exact cause for your pain. Your doctor today has decided that it is unlikely your pain is due to a life threatening problem, or a problem requiring surgery or hospital admission. Sometimes those problems cannot be found right away, so it is very important that you follow up as directed.  Sometimes only the changes which occur over time allow the cause of your pain to be found.    Return to the Emergency Department for a recheck in 8-12 hours if your pain continues.  If your pain gets worse, changes in location, or feels different, return to the Emergency Department right away.    ADULTS:  Return to the Emergency Department right away if:    You get an oral temperature above 102oF or as directed by your doctor.  You have blood in your stools (bright red or black, tarry stools).  You keep throwing up or can t drink liquids.  You see blood when you throw up.  You can t have a bowel movement or you can t pass gas.  Your stomach gets bloated or bigger.  Your skin or the whites of your eyes look yellow.  You faint.  You have bloody, frequent or painful urination.  You have new symptoms or anything that worries you.    CHILDREN:  Return to the Emergency Department right away if your child has any of the above-listed symptoms or the following:    Pushes your hand away or screams/cries  when his/her belly is touched.  You notice your child is very fussy or weak.  Your child is very tired and is too tired to eat or drink.  Your child is dehydrated.  Signs of dehydration can be:  Your infant has had no wet diapers in 4-5 hours.  Your older child has not passed urine in 6-8 hours.  Your infant or child starts to have dry mouth and lips, or no saliva or tears.    PREGNANT WOMEN:  Return to the Emergency Department right away if you have any of the above-listed symptoms or the following:    You have bleeding, leaking fluid or passing tissue from the vagina.  You have worse pain or cramping, or pain in your shoulder or back.  You have vomiting that will not stop.  You have painful or bloody urination.  You have a temperature of 100oF or more.  Your baby is not moving as much as usual.  You faint.  You get a bad headache with or without eye problems and abdominal pain.  You have a convulsion or seizure.  You have unusual discharge from your vagina and abdominal pain.    Abdominal pain is pretty common during pregnancy.  Your pain may or may not be related to your pregnancy. You should follow-up closely with your OB doctor so they can evaluate you and your baby.  Until you follow-up with your regular doctor, do the following:     Avoid sex and do not put anything in your vagina.  Drink clear fluids.  Only take medications approved by your doctor.    MORE INFORMATION:    Appendicitis:  A possible cause of abdominal pain in any person who still has their appendix is acute appendicitis. Appendicitis is often hard to diagnose.  Testing does not always rule out early appendicitis or other causes of abdominal pain. Close follow-up with your doctor and re-evaluations may be needed to figure out the reason for your abdominal pain.    Follow-up:  It is very important that you make an appointment with your clinic and go to the appointment.  If you do not follow-up with your primary doctor, it may result in missing  "an important development which could result in permanent injury or disability and/or lasting pain.  If there is any problem keeping your appointment, call your doctor or return to the Emergency Department.    Medications:  Take your medications as directed by your doctor today.  Before using over-the-counter medications, ask your doctor and make sure to take the medications as directed.  If you have any questions about medications, ask your doctor.    Diet:  Resume your normal diet as much as possible, but do not eat fried, fatty or spicy foods while you have pain.  Do not drink alcohol or have caffeine.  Do not smoke tobacco.    Probiotics: If you have been given an antibiotic, you may want to also take a probiotic pill or eat yogurt with live cultures. Probiotics have \"good bacteria\" to help your intestines stay healthy. Studies have shown that probiotics help prevent diarrhea and other intestine problems (including C. diff infection) when you take antibiotics. You can buy these without a prescription in the pharmacy section of the store.     If you were given a prescription for medicine here today, be sure to read all of the information (including the package insert) that comes with your prescription.  This will include important information about the medicine, its side effects, and any warnings that you need to know about.  The pharmacist who fills the prescription can provide more information and answer questions you may have about the medicine.  If you have questions or concerns that the pharmacist cannot address, please call or return to the Emergency Department.         Opioid Medication Information    Pain medications are among the most commonly prescribed medicines, so we are including this information for all our patients. If you did not receive pain medication or get a prescription for pain medicine, you can ignore it.     You may have been given a prescription for an opioid (narcotic) pain medicine " and/or have received a pain medicine while here in the Emergency Department. These medicines can make you drowsy or impaired. You must not drive, operate dangerous equipment, or engage in any other dangerous activities while taking these medications. If you drive while taking these medications, you could be arrested for DUI, or driving under the influence. Do not drink any alcohol while you are taking these medications.     Opioid pain medications can cause addiction. If you have a history of chemical dependency of any type, you are at a higher risk of becoming addicted to pain medications.  Only take these prescribed medications to treat your pain when all other options have been tried. Take it for as short a time and as few doses as possible. Store your pain pills in a secure place, as they are frequently stolen and provide a dangerous opportunity for children or visitors in your house to start abusing these powerful medications. We will not replace any lost or stolen medicine.  As soon as your pain is better, you should flush all your remaining medication.     Many prescription pain medications contain Tylenol  (acetaminophen), including Vicodin , Tylenol #3 , Norco , Lortab , and Percocet .  You should not take any extra pills of Tylenol  if you are using these prescription medications or you can get very sick.  Do not ever take more than 3000 mg of acetaminophen in any 24 hour period.    All opioids tend to cause constipation. Drink plenty of water and eat foods that have a lot of fiber, such as fruits, vegetables, prune juice, apple juice and high fiber cereal.  Take a laxative if you don t move your bowels at least every other day. Miralax , Milk of Magnesia, Colace , or Senna  can be used to keep you regular.      Remember that you can always come back to the Emergency Department if you are not able to see your regular doctor in the amount of time listed above, if you get any new symptoms, or if there is  anything that worries you.

## 2019-10-31 NOTE — LETTER
October 31, 2019      To Whom It May Concern:      Jesus Roberts was seen in our Emergency Department today, 10/31/19. Please excuse him from work until 11/4/19 or cleared by his doctor.    Sincerely,        Theodora Pinon MD

## 2019-10-31 NOTE — ED NOTES
Patient had a fall onto pavement Monday from standing height and injured right trunk area. Pt reports pain since Monday but today he coughed and heard and felt a pop to his right side causing the pain to worsen. Patient reports it hurts to take a deep breath and he is unable to d/t the pain. No pain on abdomen during palpation, no CVA tenderness- denies blood in urine. Pt had negative chest xray at [facility]. Pt sent here for further eval.